# Patient Record
Sex: MALE | NOT HISPANIC OR LATINO | Employment: STUDENT | URBAN - NONMETROPOLITAN AREA
[De-identification: names, ages, dates, MRNs, and addresses within clinical notes are randomized per-mention and may not be internally consistent; named-entity substitution may affect disease eponyms.]

---

## 2018-08-30 ENCOUNTER — PROCEDURE VISIT (OUTPATIENT)
Dept: AUDIOLOGY | Facility: CLINIC | Age: 43
End: 2018-08-30

## 2018-08-30 DIAGNOSIS — Z46.2 ENCOUNTER FOR OTHER EARMOLD IMPRESSION: Primary | ICD-10-CM

## 2018-08-30 PROCEDURE — HEARINGNOCHG: Performed by: AUDIOLOGIST

## 2018-08-30 NOTE — PROGRESS NOTES
ANESTHESIOLOGY EAR PIECE    Name:  Pineda Ratliff  :  1975  Age:  42 y.o.  Date of Evaluation:  2018      HISTORY    Reason for visit:  Pineda Ratliff is seen today to have an ear impression made for his anesthesiology ear piece.    OFFICE VISIT    During today's visit an impression were made of his left ear.  The order will be sent to [facility]. He will be contacted when the ear piece arrives so that he  can come pick it up.   His cost of $86.00 was paid in [employer] at this time.       It was a pleasure seeing Pineda Ratliff in Audiology today.  It is a pleasure helping Pineda Ratliff with his hearing needs.          This document has been electronically signed by STEPHON Hung on 2018 3:02 PM        STEPHON Hung  Licensed Audiologist    For Billing and Coding:  Z46.2  Encounter for Other Earmold Impression - no charge

## 2018-09-21 ENCOUNTER — CLINICAL SUPPORT (OUTPATIENT)
Dept: AUDIOLOGY | Facility: CLINIC | Age: 43
End: 2018-09-21

## 2018-09-21 DIAGNOSIS — Z46.2: Primary | ICD-10-CM

## 2018-09-21 PROCEDURE — HEARINGNOCHG: Performed by: AUDIOLOGIST

## 2018-09-21 NOTE — PROGRESS NOTES
MUSICIAN EARPLUG    Name:  Pineda Ratliff  :  1975  Age:  42 y.o.  Date of Evaluation:  2018      HISTORY    Reason for visit:  Pineda Ratliff is seen today to  his musician earplugs.  During his last visit on 2018 an earmold was also made of the right ear as the patient expressed interest in purchasing musician earplugs.  The patient called back at a later date to express that he would like to have these ordered. These earplugs were made and the patient came to pick them up today.      OFFICE VISIT    During today's visit the patient was shown how to insert and remove the musician's earplugs. He was also shown how to change the earplug filters.  The patient reported that the plugs felt comfortable in his ears and it was confirmed that the plugs fit snugly in his ears.  The patient paid $145.00 today in [employer] for the earpieces.    It was a pleasure seeing Pineda Ratliff in Audiology today.  It is a pleasure helping Mr. Ratliff with his amplification needs.          This document has been electronically signed by STEPHON Hung on 2018 3:30 PM        STEPHON Hung  Licensed Audiologist    For Billing and Coding:    Ear mold, Insert, Not disposable, any type - no charge